# Patient Record
Sex: MALE | Race: WHITE | Employment: FULL TIME | URBAN - NONMETROPOLITAN AREA
[De-identification: names, ages, dates, MRNs, and addresses within clinical notes are randomized per-mention and may not be internally consistent; named-entity substitution may affect disease eponyms.]

---

## 2019-03-02 ENCOUNTER — APPOINTMENT (OUTPATIENT)
Dept: GENERAL RADIOLOGY | Age: 39
End: 2019-03-02
Payer: MEDICARE

## 2019-03-02 ENCOUNTER — HOSPITAL ENCOUNTER (EMERGENCY)
Age: 39
Discharge: HOME | End: 2019-03-02
Payer: MEDICARE

## 2019-03-02 VITALS
WEIGHT: 180 LBS | HEART RATE: 83 BPM | BODY MASS INDEX: 25.77 KG/M2 | OXYGEN SATURATION: 97 % | DIASTOLIC BLOOD PRESSURE: 107 MMHG | HEIGHT: 70 IN | TEMPERATURE: 97.9 F | SYSTOLIC BLOOD PRESSURE: 148 MMHG | RESPIRATION RATE: 18 BRPM

## 2019-03-02 DIAGNOSIS — S40.011A CONTUSION OF RIGHT SHOULDER, INITIAL ENCOUNTER: Primary | ICD-10-CM

## 2019-03-02 PROCEDURE — 6370000000 HC RX 637 (ALT 250 FOR IP): Performed by: PHYSICIAN ASSISTANT

## 2019-03-02 PROCEDURE — 73060 X-RAY EXAM OF HUMERUS: CPT

## 2019-03-02 PROCEDURE — 73000 X-RAY EXAM OF COLLAR BONE: CPT

## 2019-03-02 PROCEDURE — 99283 EMERGENCY DEPT VISIT LOW MDM: CPT

## 2019-03-02 RX ORDER — IBUPROFEN 600 MG/1
600 TABLET ORAL 4 TIMES DAILY PRN
Qty: 30 TABLET | Refills: 0 | Status: SHIPPED | OUTPATIENT
Start: 2019-03-02

## 2019-03-02 RX ORDER — HYDROCODONE BITARTRATE AND ACETAMINOPHEN 5; 325 MG/1; MG/1
1 TABLET ORAL ONCE
Status: COMPLETED | OUTPATIENT
Start: 2019-03-02 | End: 2019-03-02

## 2019-03-02 RX ORDER — HYDROCODONE BITARTRATE AND ACETAMINOPHEN 5; 325 MG/1; MG/1
1 TABLET ORAL EVERY 6 HOURS PRN
Qty: 16 TABLET | Refills: 0 | Status: SHIPPED | OUTPATIENT
Start: 2019-03-02 | End: 2019-03-06

## 2019-03-02 RX ADMIN — HYDROCODONE BITARTRATE AND ACETAMINOPHEN 1 TABLET: 5; 325 TABLET ORAL at 15:54

## 2019-03-02 ASSESSMENT — PAIN SCALES - GENERAL
PAINLEVEL_OUTOF10: 8
PAINLEVEL_OUTOF10: 8

## 2019-03-02 ASSESSMENT — PAIN DESCRIPTION - LOCATION: LOCATION: SHOULDER

## 2019-03-02 ASSESSMENT — PAIN DESCRIPTION - PAIN TYPE: TYPE: ACUTE PAIN

## 2021-01-26 ENCOUNTER — APPOINTMENT (OUTPATIENT)
Dept: GENERAL RADIOLOGY | Age: 41
End: 2021-01-26

## 2021-01-26 ENCOUNTER — HOSPITAL ENCOUNTER (EMERGENCY)
Age: 41
Discharge: HOME | End: 2021-01-26
Attending: EMERGENCY MEDICINE

## 2021-01-26 VITALS
SYSTOLIC BLOOD PRESSURE: 161 MMHG | DIASTOLIC BLOOD PRESSURE: 107 MMHG | OXYGEN SATURATION: 98 % | HEART RATE: 87 BPM | TEMPERATURE: 98.6 F | RESPIRATION RATE: 14 BRPM

## 2021-01-26 DIAGNOSIS — S62.635A CLOSED DISPLACED FRACTURE OF DISTAL PHALANX OF LEFT RING FINGER, INITIAL ENCOUNTER: Primary | ICD-10-CM

## 2021-01-26 PROCEDURE — 6370000000 HC RX 637 (ALT 250 FOR IP): Performed by: EMERGENCY MEDICINE

## 2021-01-26 PROCEDURE — 99283 EMERGENCY DEPT VISIT LOW MDM: CPT

## 2021-01-26 PROCEDURE — 73130 X-RAY EXAM OF HAND: CPT

## 2021-01-26 RX ORDER — IBUPROFEN 800 MG/1
800 TABLET ORAL ONCE
Status: COMPLETED | OUTPATIENT
Start: 2021-01-26 | End: 2021-01-26

## 2021-01-26 RX ORDER — ACETAMINOPHEN 325 MG/1
650 TABLET ORAL ONCE
Status: DISCONTINUED | OUTPATIENT
Start: 2021-01-26 | End: 2021-01-26

## 2021-01-26 RX ORDER — ACETAMINOPHEN 500 MG
1000 TABLET ORAL ONCE
Status: COMPLETED | OUTPATIENT
Start: 2021-01-26 | End: 2021-01-26

## 2021-01-26 RX ORDER — HYDROCODONE BITARTRATE AND ACETAMINOPHEN 5; 325 MG/1; MG/1
1 TABLET ORAL EVERY 6 HOURS PRN
Qty: 10 TABLET | Refills: 0 | Status: SHIPPED | OUTPATIENT
Start: 2021-01-26 | End: 2021-01-29

## 2021-01-26 RX ADMIN — IBUPROFEN 800 MG: 800 TABLET ORAL at 22:34

## 2021-01-26 RX ADMIN — ACETAMINOPHEN 1000 MG: 500 TABLET, FILM COATED ORAL at 22:34

## 2021-01-26 ASSESSMENT — ENCOUNTER SYMPTOMS
COLOR CHANGE: 0
VOMITING: 0
NAUSEA: 0
ABDOMINAL PAIN: 0
CHEST TIGHTNESS: 0
SHORTNESS OF BREATH: 0

## 2021-01-26 ASSESSMENT — PAIN DESCRIPTION - LOCATION: LOCATION: HAND

## 2021-01-26 ASSESSMENT — PAIN SCALES - GENERAL: PAINLEVEL_OUTOF10: 8

## 2021-01-26 ASSESSMENT — PAIN DESCRIPTION - PAIN TYPE: TYPE: ACUTE PAIN

## 2021-01-26 NOTE — LETTER
[facility] ED  [address] [place] [address]  Phone: [phone]  Fax: [phone]               January 26, 2021    Patient: Awilda Pritchard   YOB: 1980   Date of Visit: 1/26/2021       To Whom It May Concern:    Awilda Pritchard was seen and treated in our emergency department on 1/26/2021. He may return to work on 1/27/21.       Sincerely,       Selam Perez RN         Signature:__________________________________

## 2021-01-27 NOTE — ED PROVIDER NOTES
[facility] [place] ED  Emergency Department Encounter  EmergencyMedicine Attending     Pt Name:Rubens Mixon  MRN: [id number]  [place] 1980  Date of evaluation: 1/26/21  PCP:  No primary care provider on file. CHIEF COMPLAINT       Chief Complaint   Patient presents with    Hand Injury     left ring finger injured occured a few days ago. HISTORY OF PRESENT ILLNESS  (Location/Symptom, Timing/Onset, Context/Setting, Quality, Duration, Modifying Factors, Severity.)      Candice Seen is a 36 y.o. male who presents with an injury to the left ring finger. Pain is 8 out of 10, has not taken anything for the pain, had an injury to the digit a couple of days ago. Was moving a dryer, dryer slipped and his finger got stuck between the dryer and a metal railing. Range of motion is intact with some tingling and numbness sensation distally on the digit. No proximal loss of sensation. Good capillary refill as well. Has not taken anything for pain. Pain is sharp, constant for the last 2 days, no radiations. PAST MEDICAL / SURGICAL / SOCIAL / FAMILY HISTORY      has a past medical history of Seizures ([address] has a past surgical history that includes brain surgery and hernia repair.     Social History     Socioeconomic History    Marital status: Single     Spouse name: Not on file    Number of children: Not on file    Years of education: Not on file    Highest education level: Not on file   Occupational History    Not on file   Social Needs    Financial resource strain: Not on file    Food insecurity     Worry: Not on file     Inability: Not on file    Transportation needs     Medical: Not on file     Non-medical: Not on file   Tobacco Use    Smoking status: Current Every Day Smoker    Smokeless tobacco: Never Used   Substance and Sexual Activity    Alcohol use: Not on file    Drug use: Not on file    Sexual activity: Not on file   Lifestyle    Physical activity     Days per week: Not on file     Minutes per session: Not on file    Stress: Not on file   Relationships    Social connections     Talks on phone: Not on file     Gets together: Not on file     Attends Bahai service: Not on file     Active member of club or organization: Not on file     Attends meetings of clubs or organizations: Not on file     Relationship status: Not on file    Intimate partner violence     Fear of current or ex partner: Not on file     Emotionally abused: Not on file     Physically abused: Not on file     Forced sexual activity: Not on file   Other Topics Concern    Not on file   Social History Narrative    Not on file       No family history on file. Allergies:  Penicillins    Home Medications:  Prior to Admission medications    Medication Sig Start Date End Date Taking? Authorizing Provider   HYDROcodone-acetaminophen (NORCO) 5-325 MG per tablet Take 1 tablet by mouth every 6 hours as needed for Pain for up to 3 days. Intended supply: 3 days. Take lowest dose possible to manage pain 1/26/21 1/29/21 Yes Lakesha Chirinos MD   ibuprofen (ADVIL;MOTRIN) 600 MG tablet Take 1 tablet by mouth 4 times daily as needed for Pain 3/2/19   Vick Jesus PA-C       REVIEW OF SYSTEMS    (2-9 systems for level 4, 10 or more for level 5)      Review of Systems   Constitutional: Negative for chills and fever. Respiratory: Negative for chest tightness and shortness of breath. Cardiovascular: Negative for chest pain and leg swelling. Gastrointestinal: Negative for abdominal pain, nausea and vomiting. Genitourinary: Negative for dysuria. Musculoskeletal: Positive for arthralgias. Skin: Negative for color change. Neurological: Positive for numbness. Negative for weakness. Psychiatric/Behavioral: Negative for confusion.        PHYSICAL EXAM   (up to 7 for level 4, 8 or more for level 5)      INITIAL VITALS:   BP (!) 161/107   Pulse 87   Temp 98.6 °F (37 °C) (Tympanic)   Resp 14   SpO2 98%     Physical Exam  Constitutional:       General: He is not in acute distress. Appearance: He is well-developed. HENT:      Head: Normocephalic and atraumatic. Cardiovascular:      Rate and Rhythm: Normal rate and regular rhythm. Pulmonary:      Effort: Pulmonary effort is normal. No respiratory distress. Musculoskeletal:         General: Tenderness present. Comments: Swelling of the distal phalanx of the ring finger of the left hand with mild tenderness. Some diminished sensation at the tip but some minimal sensation is still intact. Good capillary refill otherwise. Otherwise range of motion is intact at the DIP and PIP joint. Skin:     General: Skin is warm and dry. Findings: No erythema. DIFFERENTIAL  DIAGNOSIS     PLAN (LABS / IMAGING / EKG):  Orders Placed This Encounter   Procedures    XR HAND LEFT (MIN 3 VIEWS)    Application finger splint static       MEDICATIONS ORDERED:  Orders Placed This Encounter   Medications    ibuprofen (ADVIL;MOTRIN) tablet 800 mg    DISCONTD: acetaminophen (TYLENOL) tablet 650 mg    acetaminophen (TYLENOL) tablet 1,000 mg    hydrocodone-acetaminophen (NORCO) tablet 5-325 mg (STARTER PACK)    HYDROcodone-acetaminophen (NORCO) 5-325 MG per tablet     Sig: Take 1 tablet by mouth every 6 hours as needed for Pain for up to 3 days. Intended supply: 3 days. Take lowest dose possible to manage pain     Dispense:  10 tablet     Refill:  0       DDX: Musculoskeletal pain versus fracture    DIAGNOSTIC RESULTS / EMERGENCY DEPARTMENT COURSE / MDM   :  No results found for this visit on 01/26/21. IMPRESSION: 42-year-old male comes in with finger pain, injury 2 days ago, x-ray confirmed a distal phalanx fracture. Patient was placed in a finger splint, otherwise we will continue pain control and have patient follow-up with orthopedic surgery. Good capillary refill and blood flow to the digit otherwise. No open skin wound.     RADIOLOGY:    Xr Hand Left (min 3 Views)    Result Date: 1/26/2021  EXAMINATION: THREE XRAY VIEWS OF THE LEFT HAND 1/26/2021 10:17 pm COMPARISON: None. HISTORY: ORDERING SYSTEM PROVIDED HISTORY: pain TECHNOLOGIST PROVIDED HISTORY: pain FINDINGS: There is a displaced fracture through the ring finger distal phalanx with anterior subluxation at the DIP joint. There is edema in the overlying soft tissues. There is a small osseous fragment adjacent to the little finger proximal phalanx. 1. Displaced fracture of the ring finger distal phalanx with subluxation at the DIP joint. 2. Small osseous fragment adjacent to the little finger proximal phalanx, which could represent a mildly displaced fracture. EKG  None    All EKG's are interpreted by the Emergency Department Physician who either signs or Co-signs this chart in the absence of a cardiologist.      PROCEDURES:  None    CONSULTS:  None    CRITICAL CARE:  None    FINAL IMPRESSION      1. Closed displaced fracture of distal phalanx of left ring finger, initial encounter          DISPOSITION / PLAN     DISPOSITION Decision To Discharge 01/26/2021 10:44:49 PM      PATIENT REFERRED TO:  Licha Carter MD  [address] [place] [address]  [phone]    Call in 2 days        DISCHARGE MEDICATIONS:  Discharge Medication List as of 1/26/2021 10:46 PM      START taking these medications    Details   HYDROcodone-acetaminophen (NORCO) 5-325 MG per tablet Take 1 tablet by mouth every 6 hours as needed for Pain for up to 3 days. Intended supply: 3 days.  Take lowest dose possible to manage pain, Disp-10 tablet, R-0Print             Evan Pham MD  Emergency Medicine Attending    (Please note that portions of thisnote were completed with a voice recognition program.  Efforts were made to edit the dictations but occasionally words are mis-transcribed.)        Evan Pham MD  01/26/21 [id number]

## 2021-02-11 ENCOUNTER — APPOINTMENT (OUTPATIENT)
Dept: CT IMAGING | Age: 41
End: 2021-02-11

## 2021-02-11 ENCOUNTER — HOSPITAL ENCOUNTER (EMERGENCY)
Age: 41
Discharge: HOME | End: 2021-02-11
Attending: EMERGENCY MEDICINE

## 2021-02-11 VITALS
OXYGEN SATURATION: 98 % | TEMPERATURE: 97.6 F | HEART RATE: 92 BPM | RESPIRATION RATE: 16 BRPM | DIASTOLIC BLOOD PRESSURE: 84 MMHG | SYSTOLIC BLOOD PRESSURE: 146 MMHG
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
## 2021-02-11 DIAGNOSIS — S20.222A CONTUSION OF LEFT SIDE OF BACK, INITIAL ENCOUNTER: Primary | ICD-10-CM

## 2021-02-11 PROCEDURE — 96372 THER/PROPH/DIAG INJ SC/IM: CPT

## 2021-02-11 PROCEDURE — 72128 CT CHEST SPINE W/O DYE: CPT

## 2021-02-11 PROCEDURE — 6370000000 HC RX 637 (ALT 250 FOR IP): Performed by: EMERGENCY MEDICINE

## 2021-02-11 PROCEDURE — 99283 EMERGENCY DEPT VISIT LOW MDM: CPT

## 2021-02-11 PROCEDURE — 72131 CT LUMBAR SPINE W/O DYE: CPT

## 2021-02-11 PROCEDURE — 6360000002 HC RX W HCPCS: Performed by: EMERGENCY MEDICINE

## 2021-02-11 RX ORDER — IBUPROFEN 800 MG/1
800 TABLET ORAL EVERY 8 HOURS PRN
Qty: 30 TABLET | Refills: 0 | Status: SHIPPED | OUTPATIENT
Start: 2021-02-11

## 2021-02-11 RX ORDER — LIDOCAINE 50 MG/G
1 PATCH TOPICAL DAILY
Qty: 10 PATCH | Refills: 0 | Status: SHIPPED | OUTPATIENT
Start: 2021-02-11 | End: 2021-02-21

## 2021-02-11 RX ORDER — ORPHENADRINE CITRATE 30 MG/ML
60 INJECTION INTRAMUSCULAR; INTRAVENOUS ONCE
Status: COMPLETED | OUTPATIENT
Start: 2021-02-11 | End: 2021-02-11

## 2021-02-11 RX ORDER — ACETAMINOPHEN 500 MG
1000 TABLET ORAL ONCE
Status: COMPLETED | OUTPATIENT
Start: 2021-02-11 | End: 2021-02-11

## 2021-02-11 RX ORDER — TIZANIDINE 4 MG/1
4 TABLET ORAL 3 TIMES DAILY PRN
Qty: 15 TABLET | Refills: 0 | Status: SHIPPED | OUTPATIENT
Start: 2021-02-11 | End: 2021-03-14 | Stop reason: ALTCHOICE

## 2021-02-11 RX ADMIN — ACETAMINOPHEN 1000 MG: 500 TABLET, FILM COATED ORAL at 19:27

## 2021-02-11 RX ADMIN — ORPHENADRINE CITRATE 60 MG: 60 INJECTION INTRAMUSCULAR; INTRAVENOUS at 19:27

## 2021-02-11 ASSESSMENT — ENCOUNTER SYMPTOMS
COLOR CHANGE: 0
VOMITING: 0
NAUSEA: 0
ABDOMINAL PAIN: 0
BACK PAIN: 1
CHEST TIGHTNESS: 0
SHORTNESS OF BREATH: 0

## 2021-02-11 ASSESSMENT — PAIN SCALES - GENERAL
PAINLEVEL_OUTOF10: 10
PAINLEVEL_OUTOF10: 7

## 2021-02-11 ASSESSMENT — PAIN DESCRIPTION - ORIENTATION: ORIENTATION: LEFT

## 2021-02-12 NOTE — ED PROVIDER NOTES
Every Day Smoker    Smokeless tobacco: Never Used   Substance and Sexual Activity    Alcohol use: Not on file    Drug use: Not on file    Sexual activity: Not on file   Lifestyle    Physical activity     Days per week: Not on file     Minutes per session: Not on file    Stress: Not on file   Relationships    Social connections     Talks on phone: Not on file     Gets together: Not on file     Attends Bahai service: Not on file     Active member of club or organization: Not on file     Attends meetings of clubs or organizations: Not on file     Relationship status: Not on file    Intimate partner violence     Fear of current or ex partner: Not on file     Emotionally abused: Not on file     Physically abused: Not on file     Forced sexual activity: Not on file   Other Topics Concern    Not on file   Social History Narrative    Not on file       No family history on file. Allergies:  Penicillins    Home Medications:  Prior to Admission medications    Medication Sig Start Date End Date Taking? Authorizing Provider   ibuprofen (ADVIL;MOTRIN) 800 MG tablet Take 1 tablet by mouth every 8 hours as needed for Pain 2/11/21  Yes Ricki Zaman MD   tiZANidine (ZANAFLEX) 4 MG tablet Take 1 tablet by mouth 3 times daily as needed (Back pain) 2/11/21  Yes Ricki Zaman MD   lidocaine (LIDODERM) 5 % Place 1 patch onto the skin daily for 10 days 12 hours on, 12 hours off. 2/11/21 2/21/21 Yes Ricki Zaman MD   ibuprofen (ADVIL;MOTRIN) 600 MG tablet Take 1 tablet by mouth 4 times daily as needed for Pain 3/2/19   Christ Buerger, PA-C       REVIEW OF SYSTEMS    (2-9 systems for level 4, 10 or more for level 5)      Review of Systems   Constitutional: Negative for chills and fever. Respiratory: Negative for chest tightness and shortness of breath. Cardiovascular: Negative for chest pain. Gastrointestinal: Negative for abdominal pain, nausea and vomiting. Genitourinary: Negative for dysuria. Musculoskeletal: Positive for back pain. Negative for gait problem and neck pain. Skin: Negative for color change. Neurological: Negative for weakness and numbness. Psychiatric/Behavioral: Negative for confusion. PHYSICAL EXAM   (up to 7 for level 4, 8 or more for level 5)      INITIAL VITALS:   BP (!) 146/84   Pulse 92   Temp 97.6 °F (36.4 °C) (Temporal)   Resp 16   SpO2 98%     Physical Exam  Vitals signs reviewed. Constitutional:       General: He is not in acute distress. Appearance: He is well-developed. HENT:      Head: Normocephalic and atraumatic. Eyes:      General:         Right eye: No discharge. Left eye: No discharge. Conjunctiva/sclera: Conjunctivae normal.   Cardiovascular:      Rate and Rhythm: Normal rate and regular rhythm. Heart sounds: Normal heart sounds. No murmur. No friction rub. No gallop. Pulmonary:      Effort: Pulmonary effort is normal. No respiratory distress. Breath sounds: Normal breath sounds. No wheezing or rales. Abdominal:      General: There is no distension. Palpations: Abdomen is soft. Tenderness: There is no abdominal tenderness. There is no guarding or rebound. Comments: No abdominal tenderness on examination. Musculoskeletal:         General: Tenderness present. Comments: Midline lower thoracic tenderness as well as midline lumbar tenderness on examination, significant left-sided paraspinal tenderness, tenderness appears to be significantly worse paraspinally over the left lumbar spine. Pelvis stable, no elbow tenderness. Skin:     General: Skin is warm and dry. Findings: No erythema.    Neurological:      Comments: Normal stance and sensation of the bilateral lower extremities, normal patellar reflexes bilaterally         DIFFERENTIAL  DIAGNOSIS     PLAN (LABS / IMAGING / EKG):  Orders Placed This Encounter   Procedures    CT THORACIC SPINE WO CONTRAST    CT LUMBAR SPINE [facility] MEDICATIONS ORDERED:  Orders Placed This Encounter   Medications    orphenadrine (NORFLEX) injection 60 mg    acetaminophen (TYLENOL) tablet 1,000 mg    ibuprofen (ADVIL;MOTRIN) 800 MG tablet     Sig: Take 1 tablet by mouth every 8 hours as needed for Pain     Dispense:  30 tablet     Refill:  0    tiZANidine (ZANAFLEX) 4 MG tablet     Sig: Take 1 tablet by mouth 3 times daily as needed (Back pain)     Dispense:  15 tablet     Refill:  0    lidocaine (LIDODERM) 5 %     Sig: Place 1 patch onto the skin daily for 10 days 12 hours on, 12 hours off. Dispense:  10 patch     Refill:  0       DDX: Contusion versus sprain versus strain versus musculoskeletal pain versus fracture versus sciatica versus disc herniation versus cauda equina    DIAGNOSTIC RESULTS / EMERGENCY DEPARTMENT COURSE / MDM   :  No results found for this visit on 02/11/21. IMPRESSION: 77-year-old male who presents with back pain after a fall down at least 5 steps. No head injury, no anticoagulation medication, no loss of consciousness. Most the pain is over the lower thoracic and lumbar spine with the worst pain being paraspinal but there is significant midline pain as well. CT of the thoracic and lumbar spine were done especially given the mechanism down 5 steps. no numbness weakness, neurologically intact, able to ambulate without any issue, will do pain control with Tylenol and Norflex. Will otherwise see what the CT imaging shows. RADIOLOGY:    Xr Hand Left (min 3 Views)    Result Date: 1/26/2021  EXAMINATION: THREE XRAY VIEWS OF THE LEFT HAND 1/26/2021 10:17 pm COMPARISON: None. HISTORY: ORDERING SYSTEM PROVIDED HISTORY: pain TECHNOLOGIST PROVIDED HISTORY: pain FINDINGS: There is a displaced fracture through the ring finger distal phalanx with anterior subluxation at the DIP joint. There is edema in the overlying soft tissues. There is a small osseous fragment adjacent to the little finger proximal phalanx.      1. Displaced fracture of the ring finger distal phalanx with subluxation at the DIP joint. 2. Small osseous fragment adjacent to the little finger proximal phalanx, which could represent a mildly displaced fracture. Ct Thoracic Spine Wo Contrast    Result Date: 2/11/2021  EXAMINATION: CT OF THE THORACIC SPINE WITHOUT CONTRAST  2/11/2021 7:37 pm: TECHNIQUE: CT of the thoracic spine was performed without the administration of intravenous contrast. Multiplanar reformatted images are provided for review. Dose modulation, iterative reconstruction, and/or weight based adjustment of the mA/kV was utilized to reduce the radiation dose to as low as reasonably achievable. COMPARISON: None. HISTORY: ORDERING SYSTEM PROVIDED HISTORY: Midline tenderness T9 onwards to L spine. Fall down 5 steps. TECHNOLOGIST PROVIDED HISTORY: Midline tenderness T9 onwards to L spine. Fall down 5 steps. FINDINGS: BONES/ALIGNMENT: There is normal alignment of the spine. The vertebral body heights are maintained. No osseous destructive lesion is seen. DEGENERATIVE CHANGES: No gross spinal canal stenosis or bony neural foraminal narrowing of the thoracic spine. SOFT TISSUES: No paraspinal mass is seen. 3 mm noncalcified pleural based right lower lobe nodule. No acute abnormality of the thoracic spine. 3 mm noncalcified right lower lobe nodule. RECOMMENDATIONS: Guidelines for follow-up and management of pulmonary nodules found on incomplete chest CT: <6 mm - No follow up recommend on the basis of the estimated low risk of malignancy. Reference:  Radiology. 2017 Jul; 284(1):228-243     Ct Lumbar Spine Wo Contrast    Result Date: 2/11/2021  EXAMINATION: CT OF THE LUMBAR SPINE WITHOUT CONTRAST  2/11/2021 TECHNIQUE: CT of the lumbar spine was performed without the administration of intravenous contrast. Multiplanar reformatted images are provided for review.  Dose modulation, iterative reconstruction, and/or weight based adjustment of the mA/kV was utilized to reduce the radiation dose to as low as reasonably achievable. COMPARISON: None HISTORY: ORDERING SYSTEM PROVIDED HISTORY: Fall TECHNOLOGIST PROVIDED HISTORY: Fall FINDINGS: BONES/ALIGNMENT: There are 5 non-rib-bearing, lumbar type vertebral bodies. There is grade 1 anterolisthesis at L4-5. There is minimal retrolisthesis at L3-4. Vertebral body heights are maintained. No displaced fracture. DEGENERATIVE CHANGES: There is intervertebral disc height loss at L4-5 and L5-S1. There is facet DJD at L4-5 and L5-S1 SOFT TISSUES/RETROPERITONEUM: No paraspinal mass is seen. 1. No acute abnormality of the lumbar spine. 2. Degenerative change at L4-5 and L5-S1. 3. Grade 1 anterolisthesis at L4-5. EKG  None    All EKG's are interpreted by the Emergency Department Physician who either signs or Co-signs this chart in the absence of a cardiologist.    EMERGENCY DEPARTMENT COURSE:    CT imaging did not show any acute fracture, no acute abnormality, patient has normal neurologic examination, no numbness weakness, able to ambulate in the ER, will continue pain control and outpatient basis and have patient follow-up with primary care physician. PROCEDURES:  None    CONSULTS:  None    CRITICAL CARE:  None    FINAL IMPRESSION      1. Contusion of left side of back, initial encounter    2.  Fall down stairs, initial encounter          DISPOSITION / PLAN     DISPOSITION  Discharge      PATIENT REFERRED TO:  [facility]  [address]  [address] [phone]  Call in 2 days        DISCHARGE MEDICATIONS:  Discharge Medication List as of 2/11/2021  9:05 PM      START taking these medications    Details   !! ibuprofen (ADVIL;MOTRIN) 800 MG tablet Take 1 tablet by mouth every 8 hours as needed for Pain, Disp-30 tablet, R-0Print      tiZANidine (ZANAFLEX) 4 MG tablet Take 1 tablet by mouth 3 times daily as needed (Back pain), Disp-15 tablet, R-0Print      lidocaine (LIDODERM) 5 % Place 1 patch onto the skin daily for 10 days 12 hours on, 12 hours off., Disp-10 patch, R-0Print       !! - Potential duplicate medications found. Please discuss with provider.           Vanesa Chou MD  Emergency Medicine Attending    (Please note that portions of thisnote were completed with a voice recognition program.  Efforts were made to edit the dictations but occasionally words are mis-transcribed.)        Vanesa Chou MD  02/11/21 0793

## 2021-03-09 ENCOUNTER — HOSPITAL ENCOUNTER (EMERGENCY)
Age: 41
Discharge: HOME | End: 2021-03-09
Attending: EMERGENCY MEDICINE

## 2021-03-09 ENCOUNTER — APPOINTMENT (OUTPATIENT)
Dept: GENERAL RADIOLOGY | Age: 41
End: 2021-03-09

## 2021-03-09 VITALS
TEMPERATURE: 98.2 F | DIASTOLIC BLOOD PRESSURE: 106 MMHG | HEART RATE: 75 BPM | RESPIRATION RATE: 18 BRPM | OXYGEN SATURATION: 98 % | SYSTOLIC BLOOD PRESSURE: 140 MMHG

## 2021-03-09 DIAGNOSIS — M51.36 LUMBAR DEGENERATIVE DISC DISEASE: Primary | ICD-10-CM

## 2021-03-09 DIAGNOSIS — M54.32 SCIATICA OF LEFT SIDE: ICD-10-CM

## 2021-03-09 PROCEDURE — 72100 X-RAY EXAM L-S SPINE 2/3 VWS: CPT

## 2021-03-09 PROCEDURE — 72170 X-RAY EXAM OF PELVIS: CPT

## 2021-03-09 PROCEDURE — 99283 EMERGENCY DEPT VISIT LOW MDM: CPT

## 2021-03-09 PROCEDURE — 99284 EMERGENCY DEPT VISIT MOD MDM: CPT

## 2021-03-09 PROCEDURE — 6370000000 HC RX 637 (ALT 250 FOR IP): Performed by: EMERGENCY MEDICINE

## 2021-03-09 RX ORDER — CYCLOBENZAPRINE HCL 10 MG
10 TABLET ORAL ONCE
Status: COMPLETED | OUTPATIENT
Start: 2021-03-09 | End: 2021-03-09

## 2021-03-09 RX ORDER — METHYLPREDNISOLONE 4 MG/1
TABLET ORAL
Qty: 21 TABLET | Refills: 0 | Status: SHIPPED | OUTPATIENT
Start: 2021-03-09 | End: 2021-03-09 | Stop reason: SDUPTHER

## 2021-03-09 RX ORDER — CYCLOBENZAPRINE HCL 10 MG
10 TABLET ORAL 3 TIMES DAILY PRN
Qty: 21 TABLET | Refills: 0 | Status: SHIPPED | OUTPATIENT
Start: 2021-03-09 | End: 2021-03-19

## 2021-03-09 RX ORDER — CYCLOBENZAPRINE HCL 10 MG
10 TABLET ORAL 3 TIMES DAILY PRN
Qty: 21 TABLET | Refills: 0 | Status: SHIPPED | OUTPATIENT
Start: 2021-03-09 | End: 2021-03-09 | Stop reason: SDUPTHER

## 2021-03-09 RX ORDER — IBUPROFEN 400 MG/1
400 TABLET ORAL ONCE
Status: COMPLETED | OUTPATIENT
Start: 2021-03-09 | End: 2021-03-09

## 2021-03-09 RX ORDER — METHYLPREDNISOLONE 4 MG/1
TABLET ORAL
Qty: 21 TABLET | Refills: 0 | Status: SHIPPED | OUTPATIENT
Start: 2021-03-09 | End: 2021-03-15

## 2021-03-09 RX ADMIN — CYCLOBENZAPRINE 10 MG: 10 TABLET, FILM COATED ORAL at 22:06

## 2021-03-09 RX ADMIN — IBUPROFEN 400 MG: 400 TABLET, FILM COATED ORAL at 22:07

## 2021-03-09 ASSESSMENT — PAIN SCALES - GENERAL
PAINLEVEL_OUTOF10: 8
PAINLEVEL_OUTOF10: 8

## 2021-03-09 ASSESSMENT — PAIN DESCRIPTION - PAIN TYPE: TYPE: ACUTE PAIN

## 2021-03-09 NOTE — LETTER
[facility] ED  [address] [facility] [address] [place] [address]  Phone: [phone]  Fax: [phone]               March 9, 2021    Patient: Rama Rosales   YOB: 1980   Date of Visit: 3/9/2021       To Whom It May Concern:    Rama Rosales was seen and treated in our emergency department on 3/9/2021. He may return to work on 03/11/2021.       Sincerely,       Doreen Velazco RN         Signature:__________________________________

## 2021-03-10 NOTE — ED PROVIDER NOTES
[facility]  EMERGENCY DEPARTMENT ENCOUNTER      Pt Name: Sandro Manley  MRN: [id number]  [place] 1980  Date of evaluation: 3/9/2021  Provider: Prashant Wilson MD    CHIEF COMPLAINT       Chief Complaint   Patient presents with    Fall     left side pain from hip to foot         HISTORY OF PRESENT ILLNESS   (Location/Symptom, Timing/Onset, Context/Setting, Quality, Duration, Modifying Factors, Severity)  Note limiting factors. Sandro Manley is a 36 y.o. male who presents to the emergency department      68-year-old male presented emergency department for evaluation of left-sided low back pain and pains from the left buttocks down the posterior aspect of the left leg. Symptoms. Shortly after patient injured himself while moving a couch. Patient states initially he was carrying a couch and briefly became wedged between the couch and a wall. States when he was able to free himself he fell on the floor landing on his left side. He was able to stand and walk immediately following the incident. Did not hit his head. Did not lose consciousness. He has had persistent pain in his left low back and shooting pains over his left lower extremity since. He does report he has had intermittent flares of chronic low back pain. No previous injury. Patient denies any bowel or bladder dysfunction. Denies any lower extremity weakness. He has not taken anything for relief. Denies any other acute concerns. Nursing Notes were reviewed. REVIEW OF SYSTEMS    (2-9 systems for level 4, 10 or more for level 5)     Review of Systems   All other systems reviewed and are negative. Except as noted above the remainder of the review of systems was reviewed and negative.        PAST MEDICAL HISTORY     Past Medical History:   Diagnosis Date    Seizures [facility])          SURGICAL HISTORY       Past Surgical History:   Procedure Laterality Date    BRAIN SURGERY      HERNIA REPAIR           CURRENT MEDICATIONS Discharge Medication List as of 3/9/2021 11:14 PM      CONTINUE these medications which have NOT CHANGED    Details   !! ibuprofen (ADVIL;MOTRIN) 800 MG tablet Take 1 tablet by mouth every 8 hours as needed for Pain, Disp-30 tablet, R-0Print      tiZANidine (ZANAFLEX) 4 MG tablet Take 1 tablet by mouth 3 times daily as needed (Back pain), Disp-15 tablet, R-0Print      !! ibuprofen (ADVIL;MOTRIN) 600 MG tablet Take 1 tablet by mouth 4 times daily as needed for Pain, Disp-30 tablet, R-0Print       !! - Potential duplicate medications found. Please discuss with provider. ALLERGIES     Penicillins    FAMILY HISTORY     History reviewed. No pertinent family history.        SOCIAL HISTORY       Social History     Socioeconomic History    Marital status: Single     Spouse name: None    Number of children: None    Years of education: None    Highest education level: None   Occupational History    None   Social Needs    Financial resource strain: None    Food insecurity     Worry: None     Inability: None    Transportation needs     Medical: None     Non-medical: None   Tobacco Use    Smoking status: Current Every Day Smoker     Packs/day: 0.50     Types: Cigarettes    Smokeless tobacco: Never Used   Substance and Sexual Activity    Alcohol use: None    Drug use: Never    Sexual activity: Yes     Partners: Female   Lifestyle    Physical activity     Days per week: None     Minutes per session: None    Stress: None   Relationships    Social connections     Talks on phone: None     Gets together: None     Attends Buddhist service: None     Active member of club or organization: None     Attends meetings of clubs or organizations: None     Relationship status: None    Intimate partner violence     Fear of current or ex partner: None     Emotionally abused: None     Physically abused: None     Forced sexual activity: None   Other Topics Concern    None   Social History Narrative    None SCREENINGS                        PHYSICAL EXAM    (up to 7 for level 4, 8 or more for level 5)     ED Triage Vitals [03/09/21 2146]   BP Temp Temp src Pulse Resp SpO2 Height Weight   (!) 140/106 98.2 °F (36.8 °C) -- 75 18 98 % -- --       Physical Exam  Vitals signs and nursing note reviewed. Constitutional:       General: He is not in acute distress. Appearance: He is not toxic-appearing. HENT:      Head: Normocephalic and atraumatic. Neck:      Musculoskeletal: Normal range of motion and neck supple. Cardiovascular:      Rate and Rhythm: Normal rate and regular rhythm. Pulmonary:      Effort: Pulmonary effort is normal. No respiratory distress. Breath sounds: Normal breath sounds. Abdominal:      General: There is no distension. Palpations: Abdomen is soft. Tenderness: There is no abdominal tenderness. Musculoskeletal: Normal range of motion. Comments: No midline tenderness to palpation. Patient does have left lateral lateral lumbar muscular tenderness. Straight leg raise on the left positive at approximately 15 degrees. Skin:     General: Skin is warm and dry. Findings: No rash. Neurological:      General: No focal deficit present. Mental Status: He is alert and oriented to person, place, and time.    Psychiatric:         Mood and Affect: Mood normal.         DIAGNOSTIC RESULTS     EKG: All EKG's are interpreted by the Emergency Department Physician who either signs or Co-signs this chart in the absence of a cardiologist.        RADIOLOGY:   Non-plain film images such as CT, Ultrasound and MRI are read by the radiologist. Plain radiographic images are visualized and preliminarily interpreted by the emergency physician with the below findings:        Interpretation per the Radiologist below, if available at the time of this note:    XR LUMBAR SPINE (2-3 VIEWS)   Final Result   Moderate degenerative changes associated with mild anterolisthesis of L4 on L5.      Unremarkable appearance of the bony pelvis. XR PELVIS (1-2 VIEWS)   Final Result   Moderate degenerative changes associated with mild anterolisthesis of L4 on   L5. Unremarkable appearance of the bony pelvis. ED BEDSIDE ULTRASOUND:   Performed by ED Physician - none    LABS:  Labs Reviewed - No data to display    All other labs were within normal range or not returned as of this dictation. EMERGENCY DEPARTMENT COURSE and DIFFERENTIAL DIAGNOSIS/MDM:   Vitals:    Vitals:    03/09/21 2146   BP: (!) 140/106   Pulse: 75   Resp: 18   Temp: 98.2 °F (36.8 °C)   SpO2: 98%           MDM  Number of Diagnoses or Management Options  Lumbar degenerative disc disease  Sciatica of left side  Diagnosis management comments: Results reviewed and discussed with patient. Chronic findings of the x-rays were reviewed. Treatment plan of Medrol Dosepak and Flexeril as well as over-the-counter ThermaCare patches or similar products were discussed. Patient stable for discharge home. ED return and follow-up instructions discussed prior to discharge. REASSESSMENT          CRITICAL CARE TIME   Total Critical Care time was  minutes, excluding separately reportable procedures. There was a high probability of clinically significant/life threatening deterioration in the patient's condition which required my urgent intervention. CONSULTS:  None    PROCEDURES:  Unless otherwise noted below, none     Procedures        FINAL IMPRESSION      1. Lumbar degenerative disc disease    2.  Sciatica of left side          DISPOSITION/PLAN   DISPOSITION Decision To Discharge 03/09/2021 11:10:58 PM      PATIENT REFERRED TO:  [facility]  [address]          DISCHARGE MEDICATIONS:  Discharge Medication List as of 3/9/2021 11:14 PM      START taking these medications    Details   methylPREDNISolone (MEDROL, AMANDA,) 4 MG tablet Medrol Dosepak    Take by mouth as directed., Disp-21 tablet, R-0Print      cyclobenzaprine (FLEXERIL) 10 MG tablet Take 1 tablet by mouth 3 times daily as needed for Muscle spasms, Disp-21 tablet, R-0Normal           Controlled Substances Monitoring:     No flowsheet data found.     (Please note that portions of this note were completed with a voice recognition program.  Efforts were made to edit the dictations but occasionally words are mis-transcribed.)    Chaitanya Moe MD (electronically signed)  Attending Emergency Physician             Chaitanya Moe MD  03/10/21 5105

## 2021-03-14 ENCOUNTER — APPOINTMENT (OUTPATIENT)
Dept: CT IMAGING | Age: 41
End: 2021-03-14

## 2021-03-14 ENCOUNTER — HOSPITAL ENCOUNTER (EMERGENCY)
Age: 41
Discharge: HOME | End: 2021-03-15
Attending: EMERGENCY MEDICINE

## 2021-03-14 VITALS
RESPIRATION RATE: 16 BRPM | OXYGEN SATURATION: 99 % | SYSTOLIC BLOOD PRESSURE: 140 MMHG | TEMPERATURE: 98.2 F | DIASTOLIC BLOOD PRESSURE: 100 MMHG | HEART RATE: 106 BPM

## 2021-03-14 DIAGNOSIS — M51.36 LUMBAR DEGENERATIVE DISC DISEASE: ICD-10-CM

## 2021-03-14 DIAGNOSIS — M48.061 SPINAL STENOSIS OF LUMBAR REGION WITHOUT NEUROGENIC CLAUDICATION: Primary | ICD-10-CM

## 2021-03-14 PROCEDURE — 6370000000 HC RX 637 (ALT 250 FOR IP): Performed by: EMERGENCY MEDICINE

## 2021-03-14 PROCEDURE — 72131 CT LUMBAR SPINE W/O DYE: CPT

## 2021-03-14 PROCEDURE — 96372 THER/PROPH/DIAG INJ SC/IM: CPT

## 2021-03-14 PROCEDURE — 99284 EMERGENCY DEPT VISIT MOD MDM: CPT

## 2021-03-14 PROCEDURE — 6360000002 HC RX W HCPCS: Performed by: EMERGENCY MEDICINE

## 2021-03-14 RX ORDER — ORPHENADRINE CITRATE 30 MG/ML
60 INJECTION INTRAMUSCULAR; INTRAVENOUS ONCE
Status: COMPLETED | OUTPATIENT
Start: 2021-03-14 | End: 2021-03-14

## 2021-03-14 RX ORDER — ONDANSETRON 4 MG/1
4 TABLET, ORALLY DISINTEGRATING ORAL ONCE
Status: COMPLETED | OUTPATIENT
Start: 2021-03-14 | End: 2021-03-14

## 2021-03-14 RX ORDER — MORPHINE SULFATE 10 MG/ML
8 INJECTION, SOLUTION INTRAMUSCULAR; INTRAVENOUS ONCE
Status: COMPLETED | OUTPATIENT
Start: 2021-03-14 | End: 2021-03-14

## 2021-03-14 RX ADMIN — ORPHENADRINE CITRATE 60 MG: 60 INJECTION INTRAMUSCULAR; INTRAVENOUS at 23:42

## 2021-03-14 RX ADMIN — ONDANSETRON 4 MG: 4 TABLET, ORALLY DISINTEGRATING ORAL at 23:43

## 2021-03-14 RX ADMIN — MORPHINE SULFATE 8 MG: 10 INJECTION INTRAVENOUS at 23:42

## 2021-03-14 ASSESSMENT — ENCOUNTER SYMPTOMS
COUGH: 0
COLOR CHANGE: 0
SORE THROAT: 0
NAUSEA: 0
VOMITING: 0
ABDOMINAL PAIN: 0
BACK PAIN: 1

## 2021-03-14 ASSESSMENT — PAIN DESCRIPTION - LOCATION: LOCATION: BACK

## 2021-03-14 ASSESSMENT — PAIN SCALES - GENERAL: PAINLEVEL_OUTOF10: 8

## 2021-03-15 RX ORDER — IBUPROFEN 600 MG/1
600 TABLET ORAL 4 TIMES DAILY PRN
Qty: 40 TABLET | Refills: 0 | Status: SHIPPED | OUTPATIENT
Start: 2021-03-15

## 2021-03-15 RX ORDER — METHOCARBAMOL 500 MG/1
TABLET, FILM COATED ORAL
Qty: 56 TABLET | Refills: 0 | Status: SHIPPED | OUTPATIENT
Start: 2021-03-15

## 2021-03-15 RX ORDER — HYDROCODONE BITARTRATE AND ACETAMINOPHEN 5; 325 MG/1; MG/1
1 TABLET ORAL EVERY 6 HOURS PRN
Qty: 12 TABLET | Refills: 0 | Status: SHIPPED | OUTPATIENT
Start: 2021-03-15 | End: 2021-03-18

## 2021-03-15 NOTE — ED PROVIDER NOTES
[facility] ED  eMERGENCY dEPARTMENT eNCOUnter      Pt Name: Olivia Knight  MRN: [id number]  [place] 1980  Date of evaluation: 3/14/2021  Provider: Lisa Marre[address]       Chief Complaint   Patient presents with    Back Pain     Since Wed, fell on back. Was seen in ER at that time. Not getting better         HISTORY OF PRESENT ILLNESS   (Location/Symptom, Timing/Onset, Context/Setting, Quality, Duration, Modifying Factors, Severity) Note limiting factors. HPI    Olivia Knight is a 36 y.o. male who presents to the emergency department with complaint of back pain. Patient states that 5 days ago he was helping to carry a couch up some stairs when he lost his balance and fell backwards. He states he struck the ground with his low back and the couch landed on top of him. He was seen in the ER at that time he had x-rays of his low back obtained which revealed no acute fracture and he was discharged home. Patient states that he has had persistent pain in the low back since that time and is having increased pain with any type of motion and secondary to his he presents for evaluation. He denies any repeat trauma since the initial and he denies any loss of bowel or bladder control or IV drug use. Nursing Notes were reviewed. REVIEW OF SYSTEMS    (2+ for level 4; 10+ for level 5)   Review of Systems   Constitutional: Negative for chills and fever. HENT: Negative for congestion and sore throat. Respiratory: Negative for cough. Gastrointestinal: Negative for abdominal pain, nausea and vomiting. Genitourinary: Negative for dysuria and flank pain. Musculoskeletal: Positive for back pain and gait problem. Skin: Negative for color change and wound. Neurological: Negative for weakness, numbness and headaches. All other systems reviewed and are negative.       PAST MEDICAL HISTORY     Past Medical History:   Diagnosis Date    Seizures [facility])        SURGICAL HISTORY Past Surgical History:   Procedure Laterality Date    BRAIN SURGERY      HERNIA REPAIR         CURRENT MEDICATIONS       Discharge Medication List as of 3/15/2021 12:44 AM      CONTINUE these medications which have NOT CHANGED    Details   cyclobenzaprine (FLEXERIL) 10 MG tablet Take 1 tablet by mouth 3 times daily as needed for Muscle spasms, Disp-21 tablet, R-0Normal      methylPREDNISolone (MEDROL, AMANDA,) 4 MG tablet Medrol Dosepak    Take by mouth as directed., Disp-21 tablet, R-0Print      !! ibuprofen (ADVIL;MOTRIN) 800 MG tablet Take 1 tablet by mouth every 8 hours as needed for Pain, Disp-30 tablet, R-0Print      !! ibuprofen (ADVIL;MOTRIN) 600 MG tablet Take 1 tablet by mouth 4 times daily as needed for Pain, Disp-30 tablet, R-0Print       !! - Potential duplicate medications found. Please discuss with provider. ALLERGIES     Penicillins    FAMILY HISTORY     History reviewed. No pertinent family history.      SOCIAL HISTORY       Social History     Socioeconomic History    Marital status: Single     Spouse name: None    Number of children: None    Years of education: None    Highest education level: None   Occupational History    None   Social Needs    Financial resource strain: None    Food insecurity     Worry: None     Inability: None    Transportation needs     Medical: None     Non-medical: None   Tobacco Use    Smoking status: Current Every Day Smoker     Packs/day: 0.50     Types: Cigarettes    Smokeless tobacco: Never Used   Substance and Sexual Activity    Alcohol use: None    Drug use: Never    Sexual activity: Yes     Partners: Female   Lifestyle    Physical activity     Days per week: None     Minutes per session: None    Stress: None   Relationships    Social connections     Talks on phone: None     Gets together: None     Attends Congregation service: None     Active member of club or organization: None     Attends meetings of clubs or organizations: None Relationship status: None    Intimate partner violence     Fear of current or ex partner: None     Emotionally abused: None     Physically abused: None     Forced sexual activity: None   Other Topics Concern    None   Social History Narrative    None       SCREENINGS    Enochs Coma Scale  Eye Opening: Spontaneous  Best Verbal Response: Oriented  Best Motor Response: Obeys commands  Suzan Coma Scale Score: 15      PHYSICAL EXAM    (up to 7 for level 4, 8 or more for level 5)   @[email or web address]    Physical Exam  Vitals signs and nursing note reviewed. Constitutional:       General: He is not in acute distress. Appearance: Normal appearance. He is normal weight. He is not ill-appearing, toxic-appearing or diaphoretic. HENT:      Head: Normocephalic and atraumatic. Eyes:      General: No scleral icterus. Right eye: No discharge. Left eye: No discharge. Extraocular Movements: Extraocular movements intact. Conjunctiva/sclera: Conjunctivae normal.      Pupils: Pupils are equal, round, and reactive to light. Neck:      Musculoskeletal: Normal range of motion and neck supple. No neck rigidity or muscular tenderness. Cardiovascular:      Rate and Rhythm: Normal rate and regular rhythm. Pulses: Normal pulses. Heart sounds: Normal heart sounds. No murmur. No friction rub. No gallop. Pulmonary:      Effort: Pulmonary effort is normal. No respiratory distress. Breath sounds: Normal breath sounds. No wheezing, rhonchi or rales. Musculoskeletal: Normal range of motion. General: Tenderness present. No swelling, deformity or signs of injury. Comments: No bony deformity or step-off of the thoracic or lumbar spine but there is midline pain on palpation of the mid to lower lumbar spine region. No saddle anesthesia. Negative straight leg raise. No clonus or Babinski. Patellar reflexes are plus 1 out of 4 bilaterally   Skin:     General: Skin is warm and dry. Capillary Refill: Capillary refill takes less than 2 seconds. Findings: No bruising, erythema or rash. Neurological:      General: No focal deficit present. Mental Status: He is alert and oriented to person, place, and time. Cranial Nerves: No cranial nerve deficit. Sensory: No sensory deficit. Psychiatric:         Mood and Affect: Mood normal.         Behavior: Behavior normal.         Thought Content: Thought content normal.         Judgment: Judgment normal.         DIAGNOSTIC RESULTS     EKG (Per Emergency Physician):       RADIOLOGY (Per Emergency Physician): Interpretation per the Radiologist below, if available at the time of this note:  Ct Lumbar Spine Wo Contrast    Result Date: 3/15/2021  EXAMINATION: CT OF THE LUMBAR SPINE WITHOUT CONTRAST  3/14/2021 TECHNIQUE: CT of the lumbar spine was performed without the administration of intravenous contrast. Multiplanar reformatted images are provided for review. Dose modulation, iterative reconstruction, and/or weight based adjustment of the mA/kV was utilized to reduce the radiation dose to as low as reasonably achievable. COMPARISON: CT scan dated February 11, 2021 HISTORY: ORDERING SYSTEM PROVIDED HISTORY: BACK PAIN TECHNOLOGIST PROVIDED HISTORY: back pain Decision Support Exception->Emergency Medical Condition (MA) FINDINGS: BONES/ALIGNMENT: No evidence of an acute fracture or traumatic malalignment is present involving the lumbar spine. The vertebral body heights are maintained. No osseous destructive lesion is seen. DEGENERATIVE CHANGES: Grade 1 degenerative anterolisthesis is again visualized at the L4-5 level. Prominent facet arthropathy is present at the L4-5 level. A moderate to severe degree of acquired central spinal stenosis is present at the L4-5 level, secondary to a large, diffuse disc protrusion, marginal osteophyte formation, and prominent facet and ligamentous hypertrophy.   Moderate to severe narrowing of the neural foramina seen bilaterally with lateral recess stenosis present bilaterally. SOFT TISSUES/RETROPERITONEUM: No paraspinal mass is seen. 1. No evidence of an acute fracture or traumatic malalignment 2. Moderate to severe degree of acquired central spinal stenosis and bilateral neural foraminal narrowing, L4-L5 disc level secondary to grade 1 degenerative anterolisthesis, diffuse disc protrusion, and prominent facet and ligamentous hypertrophy       ED BEDSIDE ULTRASOUND:   Performed by ED Physician - none    LABS:  Labs Reviewed - No data to display     All other labs were within normal range or not returned as of this dictation. EMERGENCY DEPARTMENT COURSE and DIFFERENTIAL DIAGNOSIS/MDM:   Vitals:    Vitals:    03/14/21 2312   BP: (!) 140/100   Pulse: 106   Resp: 16   Temp: 98.2 °F (36.8 °C)   TempSrc: Tympanic   SpO2: 99%       Medications   morphine injection 8 mg (8 mg Intramuscular Given 3/14/21 2342)   ondansetron (ZOFRAN-ODT) disintegrating tablet 4 mg (4 mg Oral Given 3/14/21 2343)   orphenadrine (NORFLEX) injection 60 mg (60 mg Intramuscular Given 3/14/21 2342)       MDM. Patient presented to the ER in no acute distress and had no risk factors for cauda equina or epidural abscess. However as he reported persistent pain since his trauma with negative x-rays I felt a CT was warranted. CT reveals no acute fracture but does show grade 1 anterior listhesis as well as mild to moderate spinal stenosis with disc bulge. At this time I feel these findings are incidental as patient's pain only began after the trauma and this is still most likely musculoskeletal in nature. Therefore as I have low concern for underlying infectious process or cauda equina and exam is not consistent with any type of nerve impingement patient will be placed on symptomatic medications and discharged home.      REVAL:         CRITICAL CARE TIME   Total Critical Care time was minutes, excluding separately reportable procedures. There was a high probability of clinically significant/life threatening deterioration in the patient's condition which required my urgent intervention. CONSULTS:  None    PROCEDURES:  Unless otherwise noted below, none     Procedures    FINAL IMPRESSION      1. Spinal stenosis of lumbar region without neurogenic claudication    2. Lumbar degenerative disc disease          DISPOSITION/PLAN   DISPOSITION Decision To Discharge 03/15/2021 12:41:19 AM      PATIENT REFERRED TO:  [place] [id number]  [phone]  Schedule an appointment as soon as possible for a visit in 1 week  If symptoms worsen      DISCHARGE MEDICATIONS:  Discharge Medication List as of 3/15/2021 12:44 AM      START taking these medications    Details   HYDROcodone-acetaminophen (NORCO) 5-325 MG per tablet Take 1 tablet by mouth every 6 hours as needed for Pain for up to 3 days. Intended supply: 3 days. Take lowest dose possible to manage pain, Disp-12 tablet, R-0Print      methocarbamol (ROBAXIN) 500 MG tablet 1 to 2 pills by mouth 4 times daily as needed muscle pain/spasm, Disp-56 tablet, R-0Normal      !! ibuprofen (ADVIL;MOTRIN) 600 MG tablet Take 1 tablet by mouth 4 times daily as needed for Pain, Disp-40 tablet, R-0Normal       !! - Potential duplicate medications found. Please discuss with provider. (Please note:  Portions of this note were completed with a voice recognition program.  Efforts were made to edit the dictations but occasionally words and phrases are mis-transcribed.)  Form v2016. [id number]    Chelle Garcia DO (electronically signed)  Emergency Medicine Provider        DO [place]  03/15/21 0157

## 2021-09-30 ENCOUNTER — HOSPITAL ENCOUNTER (EMERGENCY)
Age: 41
Discharge: ANOTHER HOSPITAL | End: 2021-09-30

## 2021-09-30 VITALS
SYSTOLIC BLOOD PRESSURE: 134 MMHG | HEART RATE: 88 BPM | WEIGHT: 180 LBS | BODY MASS INDEX: 25.83 KG/M2 | TEMPERATURE: 98.7 F | RESPIRATION RATE: 14 BRPM | DIASTOLIC BLOOD PRESSURE: 88 MMHG | OXYGEN SATURATION: 99 %

## 2021-09-30 NOTE — ED NOTES
Patient noted to be using phone with proper fine motor skills; per Martha Yoo.      Brayan Kennedy RN  09/30/21 0572

## 2022-03-17 ENCOUNTER — HOSPITAL ENCOUNTER (EMERGENCY)
Age: 42
Discharge: HOME | End: 2022-03-17
Attending: EMERGENCY MEDICINE

## 2022-03-17 VITALS
TEMPERATURE: 96.6 F | SYSTOLIC BLOOD PRESSURE: 146 MMHG | OXYGEN SATURATION: 97 % | DIASTOLIC BLOOD PRESSURE: 98 MMHG | RESPIRATION RATE: 16 BRPM | HEART RATE: 95 BPM

## 2022-03-17 DIAGNOSIS — M54.10 RADICULOPATHY, UNSPECIFIED SPINAL REGION: Primary | ICD-10-CM

## 2022-03-17 PROCEDURE — 99283 EMERGENCY DEPT VISIT LOW MDM: CPT

## 2022-03-17 NOTE — ED PROVIDER NOTES
[facility] ED  EMERGENCY DEPARTMENT ENCOUNTER      Pt Name: Chuck Diallo  MRN: [id number]  [place] 1980  Date of evaluation: 3/17/2022  Provider: Jaylon Mendoza MD    CHIEF COMPLAINT       Chief Complaint   Patient presents with    Numbness     Left hand and forearm, onset a month ago. HISTORY OF PRESENT ILLNESS   (Location/Symptom, Timing/Onset, Context/Setting, Quality, Duration, Modifying Factors, Severity)  Note limiting factors. Chuck Diallo is a 39 y.o. male who presents to the emergency department with concern for numbness and tingling in his left upper extremity for over a month. States he feels like this is something to his back. States he would like a work note. Denies any other complaints. HPI    Nursing Notes were reviewed. REVIEW OF SYSTEMS    (2-9 systems for level 4, 10 or more for level 5)     Review of Systems   All other systems reviewed and are negative. Except as noted above the remainder of the review of systems was reviewed and negative. PAST MEDICAL HISTORY     Past Medical History:   Diagnosis Date    Seizures ([facility] [address]          SURGICAL HISTORY       Past Surgical History:   Procedure Laterality Date    HERNIA REPAIR           CURRENT MEDICATIONS       Previous Medications    IBUPROFEN (ADVIL;MOTRIN) 600 MG TABLET    Take 1 tablet by mouth 4 times daily as needed for Pain    IBUPROFEN (ADVIL;MOTRIN) 600 MG TABLET    Take 1 tablet by mouth 4 times daily as needed for Pain    IBUPROFEN (ADVIL;MOTRIN) 800 MG TABLET    Take 1 tablet by mouth every 8 hours as needed for Pain    METHOCARBAMOL (ROBAXIN) 500 MG TABLET    1 to 2 pills by mouth 4 times daily as needed muscle pain/spasm       ALLERGIES     Penicillins    FAMILY HISTORY     No family history on file.        SOCIAL HISTORY       Social History     Socioeconomic History    Marital status: Single     Spouse name: Not on file    Number of children: Not on file    Years of education: Not on file  Highest education level: Not on file   Occupational History    Not on file   Tobacco Use    Smoking status: Current Every Day Smoker     Packs/day: 0.50     Types: Cigarettes    Smokeless tobacco: Never Used   Substance and Sexual Activity    Alcohol use: Not on file    Drug use: Never    Sexual activity: Yes     Partners: Female   Other Topics Concern    Not on file   Social History Narrative    Not on file     Social Determinants of Health     Financial Resource Strain:     Difficulty of Paying Living Expenses: Not on file   Food Insecurity:     Worried About Running Out of Food in the Last Year: Not on file    Lary of Food in the Last Year: Not on file   Transportation Needs:     Lack of Transportation (Medical): Not on file    Lack of Transportation (Non-Medical):  Not on file   Physical Activity:     Days of Exercise per Week: Not on file    Minutes of Exercise per Session: Not on file   Stress:     Feeling of Stress : Not on file   Social Connections:     Frequency of Communication with Friends and Family: Not on file    Frequency of Social Gatherings with Friends and Family: Not on file    Attends [employer] Services: Not on file    Active Member of [address] [place] [address] or Organizations: Not on file    Attends Club or Organization Meetings: Not on file    Marital Status: Not on file   Intimate Partner Violence:     Fear of Current or Ex-Partner: Not on file    Emotionally Abused: Not on file    Physically Abused: Not on file    Sexually Abused: Not on file   Housing Stability:     Unable to Pay for Housing in the Last Year: Not on file    Number of [place] in the Last Year: Not on file    Unstable Housing in the Last Year: Not on file       SCREENINGS         Suzan Coma Scale  Eye Opening: Spontaneous  Best Verbal Response: Oriented  Best Motor Response: Obeys commands  [place] Coma Scale Score: 15                     [place] Assessment  BP: (!) 146/98  Pulse: 95 PHYSICAL EXAM    (up to 7 for level 4, 8 or more for level 5)     ED Triage Vitals [03/17/22 0238]   BP Temp Temp Source Pulse Resp SpO2 Height Weight   (!) 146/98 96.6 °F (35.9 °C) Tympanic 95 16 97 % -- --       Physical Exam  Constitutional:       General: He is not in acute distress. Appearance: He is well-developed. He is not diaphoretic. HENT:      Head: Normocephalic and atraumatic. Eyes:      General:         Right eye: No discharge. Left eye: No discharge. Neck:      Trachea: No tracheal deviation. Cardiovascular:      Rate and Rhythm: Normal rate and regular rhythm. Heart sounds: No murmur heard. No friction rub. Pulmonary:      Effort: Pulmonary effort is normal. No respiratory distress. Breath sounds: No stridor. No wheezing or rales. Chest:      Chest wall: No tenderness. Abdominal:      General: There is no distension. Palpations: Abdomen is soft. There is no mass. Tenderness: There is no abdominal tenderness. There is no guarding or rebound. Musculoskeletal:         General: No tenderness or deformity. Normal range of motion. Cervical back: Normal range of motion. Skin:     General: Skin is warm. Findings: No erythema or rash. Neurological:      Mental Status: He is alert and oriented to person, place, and time.    Psychiatric:         Behavior: Behavior normal.         DIAGNOSTIC RESULTS     EKG: All EKG's are interpreted by the Emergency Department Physician who either signs or Co-signs this chart in the absence of a cardiologist.        RADIOLOGY:   Non-plain film images such as CT, Ultrasound and MRI are read by the radiologist. Plain radiographic images are visualized and preliminarily interpreted by the emergency physician with the below findings:        Interpretation per the Radiologist below, if available at the time of this note:    No orders to display         ED BEDSIDE ULTRASOUND:   Performed by ED Physician - none    LABS:  Labs Reviewed - No data to display    All other labs were within normal range or not returned as of this dictation. EMERGENCY DEPARTMENT COURSE and DIFFERENTIAL DIAGNOSIS/MDM:   Vitals:    Vitals:    03/17/22 0238   BP: (!) 146/98   Pulse: 95   Resp: 16   Temp: 96.6 °F (35.9 °C)   TempSrc: Tympanic   SpO2: 97%           MDM  Number of Diagnoses or Management Options  Radiculopathy, unspecified spinal region  Diagnosis management comments: 42-year-old male presented with concern for left arm numbness for the past month. Exam was unremarkable for any neurovascular compromise patient had full range of motion pulses intact. Patient was provided information for orthopedic surgery as needed patient did request a work note and provided a work note and at time discharge patient had no acute focal neuro deficits and was otherwise clinically stable. REASSESSMENT          CRITICAL CARE TIME   Total Critical Care time was  minutes, excluding separately reportable procedures. There was a high probability of clinically significant/life threatening deterioration in the patient's condition which required my urgent intervention. CONSULTS:  None    PROCEDURES:  Unless otherwise noted below, none     Procedures        FINAL IMPRESSION      1. Radiculopathy, unspecified spinal region          DISPOSITION/PLAN   DISPOSITION Decision To Discharge 03/17/2022 02:40:35 AM      PATIENT REFERRED TO:  Lidia Morin MD  [address] [place] [address] Dr. Steffen Garcia [address] [place] [address]  [phone]    Schedule an appointment as soon as possible for a visit         DISCHARGE MEDICATIONS:  New Prescriptions    No medications on file     Controlled Substances Monitoring:     No flowsheet data found.     (Please note that portions of this note were completed with a voice recognition program.  Efforts were made to edit the dictations but occasionally words are mis-transcribed.)    Umu Weaver MD (electronically signed)  Attending Emergency Physician            Mirna Kat MD  03/17/22 3623

## 2022-03-17 NOTE — Clinical Note
Diana Cedeno was seen and treated in our emergency department on 3/17/2022. He may return to work on 03/18/2022. If you have any questions or concerns, please don't hesitate to call.       Estela Wolf MD

## 2024-03-08 ENCOUNTER — HOSPITAL ENCOUNTER (OUTPATIENT)
Age: 44
Discharge: HOME | End: 2024-03-08
Payer: MEDICAID

## 2024-03-08 LAB
INR PPP: 1
PROTHROMBIN TIME: 12.8 SEC (ref 11.9–14.8)

## 2024-03-08 PROCEDURE — 85610 PROTHROMBIN TIME: CPT

## 2024-03-08 PROCEDURE — 36415 COLL VENOUS BLD VENIPUNCTURE: CPT

## 2024-03-19 ENCOUNTER — HOSPITAL ENCOUNTER (OUTPATIENT)
Age: 44
Discharge: HOME | End: 2024-03-21
Payer: MEDICAID

## 2024-03-19 ENCOUNTER — HOSPITAL ENCOUNTER (OUTPATIENT)
Dept: GENERAL RADIOLOGY | Age: 44
Discharge: HOME | End: 2024-03-21
Payer: MEDICAID

## 2024-03-19 DIAGNOSIS — R52 PAIN: ICD-10-CM

## 2024-03-19 PROCEDURE — 72100 X-RAY EXAM L-S SPINE 2/3 VWS: CPT

## 2024-04-22 ENCOUNTER — HOSPITAL ENCOUNTER (OUTPATIENT)
Dept: PHYSICAL THERAPY | Age: 44
Setting detail: THERAPIES SERIES
Discharge: HOME | End: 2024-04-22
Payer: MEDICAID

## 2024-04-22 PROCEDURE — 97161 PT EVAL LOW COMPLEX 20 MIN: CPT

## 2024-04-22 PROCEDURE — G0283 ELEC STIM OTHER THAN WOUND: HCPCS

## 2024-04-22 NOTE — PRE-CERTIFICATION NOTE
[employer]      DRY NEEDLING CODES [id number] AND [id number] ARE COVERED.  NO AUTH REQ AND NO VISIT LIMIT GIVEN       [id number]

## 2024-04-24 ENCOUNTER — HOSPITAL ENCOUNTER (OUTPATIENT)
Dept: PHYSICAL THERAPY | Age: 44
Setting detail: THERAPIES SERIES
Discharge: HOME | End: 2024-04-24
Payer: MEDICAID

## 2024-04-24 NOTE — PROGRESS NOTES
[facility]  Inpatient/Observation/Outpatient Rehabilitation    Date: 2024  Patient Name: Rubens Martins       [] Inpatient Acute/Observation       [x]  Outpatient  : 1980     6/7/24  Plan of Care/Recert ends    [x] Pt no showed for scheduled appointment    [] Pt refused/declined therapy at this time due to:           [] Pt cancelled due to:  [] No Reason Given   [] Sick/ill   [] Other: Pt. [facility] appointment, called and left voicemail letting pt. Know of missed appointment and next appointment on .     [] Evaluation held by RN/Provider due to:    [] High Heart Rate   [] High Blood Pressure   [] Orthopedic Consult   [] Hgb < 7   [] Other:    [] Pt does not require skilled services due to:      Therapist/Assistant will attempt to see this patient, at our earliest opportunity.       Ira Simmons, PTA Date: 2024

## 2024-04-25 NOTE — PLAN OF CARE
[facility]           Phone: [phone]             Outpatient Physical Therapy  Fax: [phone]                                           Date: 2024  Patient: Rubens Martins : 1980 CSN #: [id number]   Referring Physician: Hina Beck MD      [x] Plan of Care   [] Updated Plan of Care    Dates of Service to Include: 2024 to 24    Diagnosis:  Spondylolisthesis at L4-5, M43.16, DDD, M51.37, Lumbosacral spondylosis with radiculopathy, M47.27, Myofascial pain, M79.18     Rehab (Treatment) Diagnosis:  Chronic LBP         Onset Date:       Attendance  Total # of Visits to Date: 1 No Show: 1 Canceled Appointment: 0    Assessment  Body Structures, Functions, Activity Limitations Requiring Skilled Therapeutic Intervention: Decreased functional mobility , Decreased ADL status, Decreased strength, Decreased endurance, Decreased balance, Decreased high-level IADLs, Increased pain, Decreased posture  Assessment: The patient is a 44 y.o. male who reports a 10 year history of LBP.  He demonstrates some LE weakness, decreased activity endurance, and impaired balance.  He would benefit from skilled PT to address his deficits to improve functional mobility.      Goals  Short Term Goals  Time Frame for Short Term Goals: 3 weeks  Short Term Goal 1: Patient will be initiated with a HEP  Short Term Goal 2: Patient will tolerate 30 minutes of therex/act to improve strength for ADLs.  Long Term Goals  Time Frame for Long Term Goals : 6 weeks  Long Term Goal 1: Patient will be independent and compliant with a HEP  Long Term Goal 2: Patient will demonstrate improved LE strength to >4/5 in all major joints an planes for ADLs.  Long Term Goal 3: Patient will report decreased pain to < 4/10 at worst.  Long Term Goal 4: Patient will improve bilateral hamstring flexibility to >80*.  Long Term Goal 5: Patient will report

## 2024-04-25 NOTE — PROGRESS NOTES
Phone: [phone]                       [facility]          Fax: [phone]                      Outpatient Physical Therapy                                                                      Evaluation  Date: 2024  Patient: Rubens Martins  : 1980  CSN #: [id number]    Referring Physician: Hina Beck MD    Medical Diagnosis: Spondylolisthesis at L4-5, M43.16, DDD, M51.37, Lumbosacral spondylosis with radiculopathy, M47.27, Myofascial pain, M79.18    Treatment Diagnosis: Chronic LBP  PT Insurance Information: [employer]  Total # of Visits Approved: 12   Total # of Visits to Date: 1  No Show: 1  Canceled Appointment: 0    [x] This writer acknowledges review of patient history form     Subjective  Subjective: Patient reports a 10 year history of LBP.  He reports central LBP that can radiate down down to his toes.  He reports pain averages 5/10.  Aggravating factors include: sitting for long periods, standing in one spot, lifting, walking longer distances.  He reports relief with lying supine and smoking marijuana.  Additional Pertinent Hx: Seizures, current smoker, anxiety, hernia repair in     Palpation:   Lumbar Spine Palpation: Tenderness and tightness of L paraspinals    Ambulation/Gait (if applicable):   Ambulation  WB Status: FWB  Ambulation  Surface: Level tile  Assistance: Independent  Quality of Gait: WNL  Distance: 25'      Objective      Strength       Strength LLE  L Hip Flexion: 4-/5  L Hip Extension: 3+/5  L Hip ABduction: 3+/5  L Knee Flexion: 4/5  L Knee Extension: 4+/5  L Ankle Dorsiflexion: 3+/5  R hip flexion: 5/5  R hip extension: 3+/5  R hip abduction: 4/5  R knee flexion:5/5  R knee extension: 5/5  R ankle dorsiflexion: 5/5         Lumbar Assessment     AROM Lumbar Spine   Flexion: 80% (2\" from toes)  Extension: 75%  Lateral Flexion Right: 100  Lateral Flexion Left: 100     Special Tests:   Special Tests Lumbar Spine  Lumbar Special Tests:

## 2024-05-01 ENCOUNTER — HOSPITAL ENCOUNTER (OUTPATIENT)
Dept: PHYSICAL THERAPY | Age: 44
Setting detail: THERAPIES SERIES
Discharge: HOME | End: 2024-05-01
Payer: MEDICAID

## 2024-05-01 PROCEDURE — 97110 THERAPEUTIC EXERCISES: CPT

## 2024-05-01 NOTE — PROGRESS NOTES
Phone: [phone]                 [facility]           Fax: [phone]                           Outpatient Physical Therapy                                                                            Daily Note    Patient: Rubens Martins : 1980  CSN #: [id number]   Referring Physician: Hina Beck MD  Date: 2024    Diagnosis: Spondylolisthesis at L4-5, M43.16, DDD, M51.37, Lumbosacral spondylosis with radiculopathy, M47.27, Myofascial pain, M79.18  Treatment Diagnosis: Chronic LBP    PT Insurance Information: [employer]  Total # of Visits Approved: 12 Per Physician Order  Total # of Visits to Date: 2  No Show: 1  Canceled Appointment: 0    24 Plan of Care/Recert Due    Pre-Treatment Pain:  5-6/10  Subjective: Pt arrives with reports of \"usual\" pain 5-6/10 in the low back.    Exercises:  Exercise 2: SciFit level 1 10 minutes  Exercise 3: Posterior pelvic tilt 10 x 10 second hold  Exercise 4: PPT with march step 3 x 10  Exercise 5: single knee to chest 2 x 10 with 3 second hold  Exercise 6: standing march, abduction, heel raises 3 x 10 bilateral  Exercise 7: side step in mini squat position 3 laps, monster walk 3 laps  Exercise 8: lateral pull down yellow theraband 3 x 10 , pallof press yellow theraband 2 x 10 bilateral    Assessment  Body Structures, Functions, Activity Limitations Requiring Skilled Therapeutic Intervention: Decreased functional mobility , Decreased ADL status, Decreased strength, Decreased endurance, Decreased balance, Decreased high-level IADLs, Increased pain, Decreased posture  Assessment: Pt arrived with reports of moderate pain in the lower back. Therapist directed pt through treatment in order to facilitate lumbar and core stability as needed for functional mobility tasks. Pt tolerated well without any change in pain reported throughout activities. Therapist provided visual demonstration and verbal cuing as needed for form and core bracing. Pt able to

## 2024-05-03 ENCOUNTER — HOSPITAL ENCOUNTER (OUTPATIENT)
Dept: PHYSICAL THERAPY | Age: 44
Setting detail: THERAPIES SERIES
Discharge: HOME | End: 2024-05-03
Payer: MEDICAID

## 2024-05-03 PROCEDURE — 97110 THERAPEUTIC EXERCISES: CPT

## 2024-05-03 NOTE — PROGRESS NOTES
Phone: [phone]                 [facility]           Fax: [phone]                           Outpatient Physical Therapy                                                                            Daily Note    Patient: Rubens Martins : 1980  CSN #: [id number]   Referring Physician: Hina Beck MD  Date: 5/3/2024       Treatment Diagnosis: Chronic LBP    PT Insurance Information: [employer]  Total # of Visits Approved: 12 Per Physician Order  Total # of Visits to Date: 3  No Show: 1  Canceled Appointment: 0    24 Plan of Care/Recert Due    Pre-Treatment Pain:  5/10  Subjective: Pt reports the \"usual\" pain 5/10 in the low back.    Exercises:  Exercise 1: HEP: SKTC 20 sec x2 ea, PPT 10 sec x10, bridge 2x6  Exercise 2: SciFit level 1 10 minutes - iso strength 5 min  Exercise 3: Posterior pelvic tilt 10 x 10 second hold  Exercise 4: PPT with march step 3 x 10  Exercise 5: single knee to chest 2 x 10 with 3 second hold  Exercise 6: standing march, abduction, heel raises 3 x 10 bilateral  Exercise 7: side step in mini squat position 3 laps, monster walk 3 laps  Exercise 8: lateral pull down yellow theraband 3 x 10 , pallof press yellow theraband 2 x 10 bilateral, LAE with marches BTB 2 x 15  Exercise 9: ball roll outs 10 x 10 second hold  Exercise 10: sit to stand with 8# ball 2  x 10    Modality: CP applied to lumbar to reduce DOMS and discomfort with relief noted.     Assessment  Body Structures, Functions, Activity Limitations Requiring Skilled Therapeutic Intervention: Decreased functional mobility , Decreased ADL status, Decreased strength, Decreased endurance, Decreased balance, Decreased high-level IADLs, Increased pain, Decreased posture  Assessment: progressed core strengthening exercises with cues for TA activation, good carryover. followed treatment session with CP to reduce DOMS. will continue    Activity Tolerance  Activity Tolerance: Patient tolerated treatment

## 2024-05-10 ENCOUNTER — HOSPITAL ENCOUNTER (OUTPATIENT)
Dept: PHYSICAL THERAPY | Age: 44
Setting detail: THERAPIES SERIES
Discharge: HOME | End: 2024-05-10
Payer: MEDICAID

## 2024-05-10 PROCEDURE — 97110 THERAPEUTIC EXERCISES: CPT

## 2024-05-10 NOTE — PROGRESS NOTES
Phone: [phone]                 [facility]           Fax: [phone]                           Outpatient Physical Therapy                                                                            Daily Note    Patient: Rubens Martins : 1980  CSN #: [id number]   Referring Physician: Hina Beck MD  Date: 5/10/2024       Treatment Diagnosis: Chronic LBP    PT Insurance Information: [employer]  Total # of Visits Approved: 12 Per Physician Order  Total # of Visits to Date: 4  No Show: 1  Canceled Appointment: 0    24 Plan of Care/Recert Due    Pre-Treatment Pain:  5/10  Subjective: Pt reports the \"usual\" pain 5/10 in the low back.    Exercises:  Exercise 1: HEP: SKTC 20 sec x2 ea, PPT 10 sec x10, bridge 2x6  Exercise 2: SciFit level 1 10 minutes - iso strength 5 min  Exercise 3: Posterior pelvic tilt 10 x 10 second hold  Exercise 4: PPT with march step 3 x 10  Exercise 5: single knee to chest 2 x 10 with 3 second hold, clamshells GTB 2 x 10  Exercise 6: standing march, abduction, heel raises 3 x 10 bilateral  Exercise 7: side step in mini squat position 3 laps, monster walk 3 laps  Exercise 8: lateral pull down yellow theraband 3 x 10 , pallof press yellow theraband 2 x 10 bilateral, LAE with marches BTB 2 x 15  Exercise 9: ball roll outs 10 x 10 second hold  Exercise 10: sit to stand with 8# ball 2  x 10    Assessment  Body Structures, Functions, Activity Limitations Requiring Skilled Therapeutic Intervention: Decreased functional mobility , Decreased ADL status, Decreased strength, Decreased endurance, Decreased balance, Decreased high-level IADLs, Increased pain, Decreased posture  Assessment: Continued with core/lumbar strengthening with good form and technique this date. Pt declined modalities post treatment session. Updated HEP with good understanding.    Activity Tolerance  Activity Tolerance: Patient tolerated treatment well    Patient Education  Patient Education: HEP

## 2024-05-15 ENCOUNTER — HOSPITAL ENCOUNTER (OUTPATIENT)
Dept: PHYSICAL THERAPY | Age: 44
Setting detail: THERAPIES SERIES
Discharge: HOME | End: 2024-05-15
Payer: MEDICAID

## 2024-05-15 PROCEDURE — 97110 THERAPEUTIC EXERCISES: CPT

## 2024-05-15 NOTE — PROGRESS NOTES
Phone: [phone]                 [facility]           Fax: [phone]                           Outpatient Physical Therapy                                                                            Daily Note    Patient: Rubens Martins : 1980  CSN #: [id number]   Referring Physician: Hina Beck MD  Date: 5/15/2024     Treatment Diagnosis: Chronic LBP    PT Insurance Information: [employer]  Total # of Visits Approved: 12 Per Physician Order  Total # of Visits to Date: 5  No Show: 1  Canceled Appointment: 0    24 Plan of Care/Recert Due    Pre-Treatment Pain:  0/10  Subjective: Pt reports hes feeling good today.  Pt states he really doesnt have any pain.    Exercises:  Exercise 2: SciFit level 1 10 minutes - iso strength 5 min  Exercise 7: side step in mini squat position 3 laps, monster walk 3 laps  Exercise 8: lateral pull down yellow theraband 3 x 10 , pallof press yellow theraband 2 x 10 bilateral, LAE with marches BTB 2 x 15  Exercise 9: ball roll outs 10 x 10 second hold  Exercise 10: sit to stand with 8# ball 2  x 10  Exercise 11: Joystick 15x ea  Exercise 12: PB: a/p lat march/ crunch  Exercise 13: side steps at counter 3 laps GTB    Assessment  Assessment: Pt able to forward flex fingertips approx 7 inches from floor, L and R sidebend figertips distal to knee joint and extend to neutral.  No increased pain noted today with ther-ex program.    Activity Tolerance  Activity Tolerance: Patient tolerated treatment well    Patient Education  Patient Education: HEP review  Pt verbalized/demonstrated good understanding:     [x] Yes         [] No, pt required further clarification.     Post Treatment Pain:  0/10    Plan  Plan Frequency: 2  Plan weeks: 6     Goals  (Total # of Visits to Date: 5)      Short Term Goals  Time Frame for Short Term Goals: 3 weeks  Short Term Goal 1: Patient will be initiated with a HEP - met  Short Term Goal 2: Patient will tolerate 30 minutes of

## 2024-05-17 ENCOUNTER — HOSPITAL ENCOUNTER (OUTPATIENT)
Dept: PHYSICAL THERAPY | Age: 44
Setting detail: THERAPIES SERIES
Discharge: HOME | End: 2024-05-17
Payer: MEDICAID

## 2024-05-17 NOTE — PROGRESS NOTES
Physical Therapy  [facility]  Inpatient/Observation/Outpatient Rehabilitation    Date: 2024  Patient Name: Rubens Martins       [] Inpatient Acute/Observation       [x]  Outpatient  : 1980     Plan of Care/Recert ends    [x] Pt no showed for scheduled appointment    [] Pt refused/declined therapy at this time due to:           [] Pt cancelled due to:  [] No Reason Given   [] Sick/ill   [] Other:    [] Evaluation held by RN/Provider due to:    [] High Heart Rate   [] High Blood Pressure   [] Orthopedic Consult   [] Hgb < 7   [] Other:    [] Pt does not require skilled services due to:      Therapist/Assistant will attempt to see this patient, at our earliest opportunity.       Kisha Chauhan Date: 2024

## 2024-05-22 ENCOUNTER — HOSPITAL ENCOUNTER (OUTPATIENT)
Dept: PHYSICAL THERAPY | Age: 44
Setting detail: THERAPIES SERIES
Discharge: HOME | End: 2024-05-22
Payer: MEDICAID

## 2024-05-22 PROCEDURE — 97110 THERAPEUTIC EXERCISES: CPT

## 2024-05-22 NOTE — PROGRESS NOTES
Phone: [phone]                 [facility]           Fax: [phone]                           Outpatient Physical Therapy                                                                            Daily Note    Patient: Rubens Martins : 1980  [facility] #: [id number]   Referring Physician: Hina Beck MD  Date: 2024    Diagnosis: Spondylolisthesis at L4-5, M43.16, DDD, M51.37, Lumbosacral spondylosis with radiculopathy, M47.27, Myofascial pain, M79.18  Treatment Diagnosis: Chronic LBP    PT Insurance Information: [employer]  Total # of Visits Approved: 12 Per Physician Order  Total # of Visits to Date: 6  No Show: 1  Canceled Appointment: 0    24 Plan of Care/Recert Due    Pre-Treatment Pain:  5/10  Subjective: Pt reports he is sore today from work the previous day. Pt does believe that working helps his pain a little bit and he often has reduction in pain as the day goes on but will end the day with increased pain.    Exercises:  Exercise 2: SciFit level 1 10 minutes  Exercise 7: side step in mini squat position 3 laps, monster walk 3 laps  Exercise 8: lateral pull down blue theraband 3 x 10 , pallof press blue theraband 2 x 10 bilateral, LAE with marches BTB 2 x 10  Exercise 9: ball roll outs 10 x 10 second hold  Exercise 10: sit to stand with 8# ball 2  x 15         Assessment  Body Structures, Functions, Activity Limitations Requiring Skilled Therapeutic Intervention: Decreased functional mobility , Decreased ADL status, Decreased strength, Decreased endurance, Decreased balance, Decreased high-level IADLs, Increased pain, Decreased posture  Assessment: Pt arrives with reports of usual pain. Therapist continued to direct pt through core and trunk stability exercises in order to facilitate strength as needed for work related tasks. Pt denied change in pain throughout exercises. Pt declined pain management mobilities at end of session.    Activity Tolerance  Activity

## 2024-05-24 ENCOUNTER — HOSPITAL ENCOUNTER (OUTPATIENT)
Dept: PHYSICAL THERAPY | Age: 44
Setting detail: THERAPIES SERIES
Discharge: HOME | End: 2024-05-24
Payer: MEDICAID

## 2024-05-24 NOTE — PROGRESS NOTES
Physical Therapy  [facility]  Inpatient/Observation/Outpatient Rehabilitation    Date: 2024  Patient Name: Rubens Martins       [] Inpatient Acute/Observation       [x]  Outpatient  : 1980     Plan of Care/Recert ends    [x] Pt no showed for scheduled appointment    [] Pt refused/declined therapy at this time due to:           [] Pt cancelled due to:  [] No Reason Given   [] Sick/ill   [] Other:    [] Evaluation held by RN/Provider due to:    [] High Heart Rate   [] High Blood Pressure   [] Orthopedic Consult   [] Hgb < 7   [] Other:    [] Pt does not require skilled services due to:      Therapist/Assistant will attempt to see this patient, at our earliest opportunity.       Kisha Chauhan Date: 2024

## 2024-05-31 ENCOUNTER — HOSPITAL ENCOUNTER (OUTPATIENT)
Dept: PHYSICAL THERAPY | Age: 44
Setting detail: THERAPIES SERIES
End: 2024-05-31
Payer: MEDICAID

## 2024-09-16 ENCOUNTER — CLINICAL DOCUMENTATION (OUTPATIENT)
Dept: PHYSICAL THERAPY | Age: 44
End: 2024-09-16